# Patient Record
Sex: MALE | Race: WHITE | NOT HISPANIC OR LATINO | Employment: OTHER | URBAN - NONMETROPOLITAN AREA
[De-identification: names, ages, dates, MRNs, and addresses within clinical notes are randomized per-mention and may not be internally consistent; named-entity substitution may affect disease eponyms.]

---

## 2023-07-19 ENCOUNTER — HOSPITAL ENCOUNTER (OUTPATIENT)
Facility: HOSPITAL | Age: 78
Setting detail: HOSPITAL OUTPATIENT SURGERY
Discharge: HOME | End: 2023-07-19
Attending: ORTHOPAEDIC SURGERY | Admitting: ORTHOPAEDIC SURGERY
Payer: MEDICARE

## 2023-07-19 VITALS
TEMPERATURE: 97 F | RESPIRATION RATE: 16 BRPM | SYSTOLIC BLOOD PRESSURE: 114 MMHG | HEART RATE: 63 BPM | DIASTOLIC BLOOD PRESSURE: 71 MMHG | OXYGEN SATURATION: 93 %

## 2023-07-19 PROBLEM — G56.03 BILATERAL CARPAL TUNNEL SYNDROME: Status: ACTIVE | Noted: 2023-07-19

## 2023-07-19 PROCEDURE — 25010000002 BETAMETHASONE ACET & SOD PHOS PER 4 MG: Performed by: ORTHOPAEDIC SURGERY

## 2023-07-19 PROCEDURE — 0 LIDOCAINE 1 % SOLUTION 20 ML VIAL: Performed by: ORTHOPAEDIC SURGERY

## 2023-07-19 RX ORDER — SODIUM CHLORIDE, SODIUM LACTATE, POTASSIUM CHLORIDE, CALCIUM CHLORIDE 600; 310; 30; 20 MG/100ML; MG/100ML; MG/100ML; MG/100ML
1000 INJECTION, SOLUTION INTRAVENOUS CONTINUOUS
Status: DISCONTINUED | OUTPATIENT
Start: 2023-07-19 | End: 2023-07-19 | Stop reason: HOSPADM

## 2023-07-19 RX ORDER — HYDROCODONE BITARTRATE AND ACETAMINOPHEN 10; 325 MG/1; MG/1
1 TABLET ORAL EVERY 4 HOURS PRN
Status: DISCONTINUED | OUTPATIENT
Start: 2023-07-19 | End: 2023-07-19 | Stop reason: HOSPADM

## 2023-07-19 RX ORDER — SODIUM CHLORIDE 0.9 % (FLUSH) 0.9 %
10 SYRINGE (ML) INJECTION AS NEEDED
Status: DISCONTINUED | OUTPATIENT
Start: 2023-07-19 | End: 2023-07-19 | Stop reason: HOSPADM

## 2023-07-19 RX ORDER — MAGNESIUM HYDROXIDE 1200 MG/15ML
LIQUID ORAL AS NEEDED
Status: DISCONTINUED | OUTPATIENT
Start: 2023-07-19 | End: 2023-07-19 | Stop reason: HOSPADM

## 2023-07-19 RX ORDER — SODIUM CHLORIDE 9 MG/ML
40 INJECTION, SOLUTION INTRAVENOUS AS NEEDED
Status: DISCONTINUED | OUTPATIENT
Start: 2023-07-19 | End: 2023-07-19 | Stop reason: HOSPADM

## 2023-07-19 RX ORDER — SODIUM CHLORIDE 0.9 % (FLUSH) 0.9 %
3-10 SYRINGE (ML) INJECTION AS NEEDED
Status: DISCONTINUED | OUTPATIENT
Start: 2023-07-19 | End: 2023-07-19 | Stop reason: HOSPADM

## 2023-07-19 RX ORDER — HYDROCODONE BITARTRATE AND ACETAMINOPHEN 5; 325 MG/1; MG/1
1 TABLET ORAL ONCE AS NEEDED
Status: DISCONTINUED | OUTPATIENT
Start: 2023-07-19 | End: 2023-07-19 | Stop reason: HOSPADM

## 2023-07-19 RX ORDER — SODIUM CHLORIDE 0.9 % (FLUSH) 0.9 %
3 SYRINGE (ML) INJECTION AS NEEDED
Status: DISCONTINUED | OUTPATIENT
Start: 2023-07-19 | End: 2023-07-19 | Stop reason: HOSPADM

## 2023-07-19 RX ORDER — DROPERIDOL 2.5 MG/ML
0.62 INJECTION, SOLUTION INTRAMUSCULAR; INTRAVENOUS ONCE AS NEEDED
Status: DISCONTINUED | OUTPATIENT
Start: 2023-07-19 | End: 2023-07-19 | Stop reason: HOSPADM

## 2023-07-19 RX ORDER — NALOXONE HCL 0.4 MG/ML
0.4 VIAL (ML) INJECTION AS NEEDED
Status: DISCONTINUED | OUTPATIENT
Start: 2023-07-19 | End: 2023-07-19 | Stop reason: HOSPADM

## 2023-07-19 RX ORDER — ONDANSETRON 2 MG/ML
4 INJECTION INTRAMUSCULAR; INTRAVENOUS ONCE AS NEEDED
Status: DISCONTINUED | OUTPATIENT
Start: 2023-07-19 | End: 2023-07-19 | Stop reason: HOSPADM

## 2023-07-19 RX ORDER — FENTANYL CITRATE 50 UG/ML
25 INJECTION, SOLUTION INTRAMUSCULAR; INTRAVENOUS
Status: DISCONTINUED | OUTPATIENT
Start: 2023-07-19 | End: 2023-07-19 | Stop reason: HOSPADM

## 2023-07-19 RX ORDER — LIDOCAINE HYDROCHLORIDE 10 MG/ML
0.5 INJECTION, SOLUTION EPIDURAL; INFILTRATION; INTRACAUDAL; PERINEURAL ONCE AS NEEDED
Status: DISCONTINUED | OUTPATIENT
Start: 2023-07-19 | End: 2023-07-19 | Stop reason: HOSPADM

## 2023-07-19 RX ORDER — SODIUM CHLORIDE 0.9 % (FLUSH) 0.9 %
3 SYRINGE (ML) INJECTION EVERY 12 HOURS SCHEDULED
Status: DISCONTINUED | OUTPATIENT
Start: 2023-07-19 | End: 2023-07-19 | Stop reason: HOSPADM

## 2023-07-19 RX ORDER — SODIUM CHLORIDE 0.9 % (FLUSH) 0.9 %
10 SYRINGE (ML) INJECTION EVERY 12 HOURS SCHEDULED
Status: DISCONTINUED | OUTPATIENT
Start: 2023-07-19 | End: 2023-07-19 | Stop reason: HOSPADM

## 2023-07-19 RX ORDER — LABETALOL HYDROCHLORIDE 5 MG/ML
5 INJECTION, SOLUTION INTRAVENOUS
Status: DISCONTINUED | OUTPATIENT
Start: 2023-07-19 | End: 2023-07-19 | Stop reason: HOSPADM

## 2023-07-19 RX ORDER — SODIUM CHLORIDE, SODIUM LACTATE, POTASSIUM CHLORIDE, CALCIUM CHLORIDE 600; 310; 30; 20 MG/100ML; MG/100ML; MG/100ML; MG/100ML
100 INJECTION, SOLUTION INTRAVENOUS CONTINUOUS
Status: DISCONTINUED | OUTPATIENT
Start: 2023-07-19 | End: 2023-07-19 | Stop reason: HOSPADM

## 2023-07-19 RX ORDER — FLUMAZENIL 0.1 MG/ML
0.2 INJECTION INTRAVENOUS AS NEEDED
Status: DISCONTINUED | OUTPATIENT
Start: 2023-07-19 | End: 2023-07-19 | Stop reason: HOSPADM

## 2023-07-19 RX ORDER — SODIUM CHLORIDE, SODIUM LACTATE, POTASSIUM CHLORIDE, CALCIUM CHLORIDE 600; 310; 30; 20 MG/100ML; MG/100ML; MG/100ML; MG/100ML
100 INJECTION, SOLUTION INTRAVENOUS CONTINUOUS PRN
Status: DISCONTINUED | OUTPATIENT
Start: 2023-07-19 | End: 2023-07-19 | Stop reason: HOSPADM

## 2023-07-19 RX ORDER — LIDOCAINE HYDROCHLORIDE AND EPINEPHRINE 10; 10 MG/ML; UG/ML
INJECTION, SOLUTION INFILTRATION; PERINEURAL AS NEEDED
Status: DISCONTINUED | OUTPATIENT
Start: 2023-07-19 | End: 2023-07-19 | Stop reason: HOSPADM

## 2023-07-19 RX ADMIN — SODIUM CHLORIDE, POTASSIUM CHLORIDE, SODIUM LACTATE AND CALCIUM CHLORIDE 1000 ML: 600; 310; 30; 20 INJECTION, SOLUTION INTRAVENOUS at 07:55

## 2023-07-19 NOTE — DISCHARGE INSTRUCTIONS
1.  Elevate operative upper extremity.  Encourage gentle finger range of motion.  Activity as tolerated with the left hand.  2.  No lifting greater than 5-10 pounds with the operative hand until follow-up.  3.  May remove dressing on postoperative day 3 and begin gentle hand hygiene.  Refrain from soaking the wound in water or applying any lotions or ointments.  4.  Pain medication as prescribed.  No additional Tylenol, alcohol or driving while on opioid pain medication.  Wean off pain medication as able.  5.  Return to clinic in 2 weeks for wound check and likely suture removal.  6.  Call our clinic number in the interim with any questions or concerns.

## 2023-07-19 NOTE — OP NOTE
Patient Name: Socorro  : 1945  MRN: [id number]      DATE of SURGERY: 2023    SURGEON: Devin Gomez MD    ASSISTANT: NONE    PREOPERATIVE DIAGNOSIS    Bilateral carpal tunnel syndrome.       POSTOPERATIVE DIAGNOSIS    Bilateral carpal tunnel syndrome.       PROCEDURE PERFORMED    Right open carpal tunnel release.    Left carpal tunnel corticosteroid injection     IMPLANTS  None.       ANESTHESIA    Monitored anesthesia care with local anesthetic      OPERATIVE INDICATIONS    The patient is a 78 y.o. male who presented to my clinic with complaints of numbness and tingling in bilateral hands with clinical exam and neurodiagnostic evidence of carpal tunnel syndrome.  The patient wished to proceed with surgery on the right and a corticosteroid injection on the left, understanding the risks, benefits, and alternatives.  Conservative treatment failed to improve symptoms.  The risks include, but are not limited to, that of anesthesia, bleeding, infection, pain, damage to the motor and sensory branch of the median nerve, chronic pillar pain, incomplete resolution of symptoms.       ESTIMATED BLOOD LOSS    Less than 5 mL.       SPECIMENS    None.       DRAINS  None.       COMPLICATIONS    None.       PROCEDURE IN DETAIL    The patient was met in the preoperative holding room where the correct patient, procedure and side were confirmed.  The operative site was marked by myself with the patient's agreement.  The consent was signed by myself.  Patient was transferred to the operating room, and a formal timeout was performed identifying the correct patient and the operative site. The tourniquet was then placed on the brachium of the operative extremity. A sterile prep and drape was performed.       A skin marker was used to delineate an approximately 1.5 cm incision in line with the radial aspect of the fourth ray beginning proximal to Blanchard's cardinal line and ending distal to the wrist flexion crease.  Prior  to inflation of the tourniquet, a local block was administered with 1% lidocaine with epinephrine. Approximately 3cc were injected at the wrist flexion crease creating a subcutaneous wheal and the remaining 7 cc injected in line with the proposed incision. The operative extremity was then exsanguinated with an Esmarch and the tourniquet was inflated to 250 mmHg for less than 10 minutes.  A 15 blade scalpel was used to make a longitudinal incision only through the skin. Soft tissue was dissected in line with the incision through the palmar fascia. The transverse carpal ligament was identified and incised until the carpal tunnel contents were identified.  A distal release was performed first utilizing deep knife and blunt tipped scissor dissection until the palmar fat pad was identified. The proximal portion of the transverse carpal ligament was released in a similar fashion. Finally, a pair of blunt-tipped scissors was inserted with the points directed toward the superficial and ulnar aspect of the wrist to protect the palmar cutaneous branch of the median nerve and the distal forearm fascia was incised.  The release was then inspected both visually and on palpation with no additional constriction points appreciated.  The median nerve was inspected and found to be intact with no significant hypertrophic tenosynovium or soft tissue masses noted.  The tourniquet was then deflated hemostasis was obtained with bipolar electrocautery.  The incision was irrigated and the skin closed with 4-0 nylon sutures.  A soft dressing was placed.  Prior to reversal of anesthesia, a left carpal tunnel corticosteroid injection was performed.  Skin overlying the wrist flexion crease was cleansed with an alcohol swab.  A 23-gauge needle was used to inject 1 cc of 1% lidocaine and 6 mg of betamethasone into the left carpal tunnel.  He did not have a palmaris longus.  Therefore, the needle was inserted approximately 1.5 cm radial to the FCU  tendon and directed in a 45 degree radial and distal direction.  He tolerated the injection well and was without complication.  The patient was awakened from anesthesia and transported to the recovery room in stable condition. All counts at the end of the procedure were correct. Patient tolerated the procedure well and was without complication.      POSTOPERATIVE PLAN  Discharge home, return to clinic in 2 weeks for suture removal and activity as tolerated. No lifting heavier than 5 pounds for 2 weeks.

## (undated) DEVICE — INTENDED FOR TISSUE SEPARATION, AND OTHER PROCEDURES THAT REQUIRE A SHARP SURGICAL BLADE TO PUNCTURE OR CUT.: Brand: BARD-PARKER ® STAINLESS STEEL BLADES

## (undated) DEVICE — SYR LL TP 10ML STRL

## (undated) DEVICE — DRSNG GZ CURAD XEROFORM NONADHS 5X9IN STRL

## (undated) DEVICE — SUT ETHLN 4/0 FS2 18IN 662H

## (undated) DEVICE — BNDG ELAS ECON W/CLIP 3IN 5YD LF STRL

## (undated) DEVICE — CVR BRD ARM 13X30

## (undated) DEVICE — SPNG GZ STRL 2S 4X4 12PLY

## (undated) DEVICE — GLV SURG DERMASSURE GRN LF PF 8.0

## (undated) DEVICE — BAPTIST TURNOVER KIT: Brand: MEDLINE INDUSTRIES, INC.

## (undated) DEVICE — 4-PORT MANIFOLD: Brand: NEPTUNE 2

## (undated) DEVICE — CABL BIPOL MEGADYNE 12FT DISP

## (undated) DEVICE — GLV SURG TRIUMPH PF LTX 7.5 STRL

## (undated) DEVICE — PK EXTRM 30

## (undated) DEVICE — DISPOSABLE TOURNIQUET CUFF 24"X4", 1-LINE, YELLOW, STERILE, 1EA/PK, 10PK/CS: Brand: ASP MEDICAL